# Patient Record
Sex: FEMALE | Race: OTHER | NOT HISPANIC OR LATINO | URBAN - METROPOLITAN AREA
[De-identification: names, ages, dates, MRNs, and addresses within clinical notes are randomized per-mention and may not be internally consistent; named-entity substitution may affect disease eponyms.]

---

## 2024-09-19 NOTE — ASU PATIENT PROFILE, ADULT - NSICDXPASTSURGICALHX_GEN_ALL_CORE_FT
PAST SURGICAL HISTORY:  H/O bladder repair surgery     H/O breast augmentation     H/O tubal ligation      PAST SURGICAL HISTORY:  H/O bladder repair surgery     H/O breast augmentation

## 2024-09-19 NOTE — ASU PATIENT PROFILE, ADULT - AS SC BRADEN SENSORY
Pt wife called with interpretor Rosio ID [id number]. States Pt had low grade fevers 3/16-3/17 but none today. Today has scant amount blood when he wiped nose. Around 3762-2750 had bilateral chest pain level 2/10 pain scale. Is not currently experiencing but is intermittent.     Pt stated he took metoprolol last night but not today. Informed Pt he is prescribed BID but Pt states he only takes once daily or BP is too low. Instructed to call Dr. Vazquez office to rectify medication use.    Pt states he has not taken 81 mg ASA today due to minimal nosebleed. Informed nosebleed likely from O2 used during procedure and should take 81 mg ASA. Instructed to only hold is nosebleed is continuous. Also suggested humidifier use.    Since Pt experiencing no other symptoms and currently asymptomatic instructed to monitor. If Pt develops fever over 100.5 F or coughs up more than tablespoon bright red blood should call office. If chest pain worsens or spreads to left side or Pt becomes diaphoretic should proceed to nearest ED. Pt and wife verbalized understanding.    (4) no impairment

## 2024-09-19 NOTE — ASU PATIENT PROFILE, ADULT - NS PREOP UNDERSTANDS INFO
Informed pt about surgery time 1015, last meal 0000 no dairy, and last drink 0700 of water or apple juice 3 hrs before surgery. Bring photo ID and isurance card to the first floor. Must have an escort that is 18+. Leave jewelry, piercings, and contacts at home./yes

## 2024-09-20 ENCOUNTER — OUTPATIENT (OUTPATIENT)
Dept: OUTPATIENT SERVICES | Facility: HOSPITAL | Age: 44
LOS: 1 days | Discharge: HOME | End: 2024-09-20
Payer: MEDICAID

## 2024-09-20 VITALS
SYSTOLIC BLOOD PRESSURE: 103 MMHG | WEIGHT: 136.25 LBS | OXYGEN SATURATION: 100 % | HEART RATE: 63 BPM | HEIGHT: 61 IN | RESPIRATION RATE: 17 BRPM | TEMPERATURE: 98 F | DIASTOLIC BLOOD PRESSURE: 68 MMHG

## 2024-09-20 VITALS
RESPIRATION RATE: 15 BRPM | HEART RATE: 82 BPM | DIASTOLIC BLOOD PRESSURE: 67 MMHG | SYSTOLIC BLOOD PRESSURE: 124 MMHG | OXYGEN SATURATION: 100 %

## 2024-09-20 DIAGNOSIS — Z98.890 OTHER SPECIFIED POSTPROCEDURAL STATES: Chronic | ICD-10-CM

## 2024-09-20 DIAGNOSIS — N93.9 ABNORMAL UTERINE AND VAGINAL BLEEDING, UNSPECIFIED: ICD-10-CM

## 2024-09-20 DIAGNOSIS — Z98.51 TUBAL LIGATION STATUS: Chronic | ICD-10-CM

## 2024-09-20 DIAGNOSIS — Z98.82 BREAST IMPLANT STATUS: Chronic | ICD-10-CM

## 2024-09-20 PROCEDURE — 88305 TISSUE EXAM BY PATHOLOGIST: CPT | Mod: 26

## 2024-09-20 RX ORDER — OXYCODONE HYDROCHLORIDE 5 MG/1
5 TABLET ORAL ONCE
Refills: 0 | Status: DISCONTINUED | OUTPATIENT
Start: 2024-09-20 | End: 2024-09-20

## 2024-09-20 RX ORDER — FENTANYL CITRATE 50 UG/ML
25 INJECTION INTRAMUSCULAR; INTRAVENOUS
Refills: 0 | Status: DISCONTINUED | OUTPATIENT
Start: 2024-09-20 | End: 2024-09-20

## 2024-09-20 RX ORDER — ACETAMINOPHEN 325 MG/1
1000 TABLET ORAL ONCE
Refills: 0 | Status: COMPLETED | OUTPATIENT
Start: 2024-09-20 | End: 2024-09-20

## 2024-09-20 RX ORDER — UBROGEPANT 50 MG/1
1 TABLET ORAL
Refills: 0 | DISCHARGE

## 2024-09-20 RX ORDER — ONDANSETRON 2 MG/ML
4 INJECTION, SOLUTION INTRAMUSCULAR; INTRAVENOUS ONCE
Refills: 0 | Status: DISCONTINUED | OUTPATIENT
Start: 2024-09-20 | End: 2024-09-20

## 2024-09-20 RX ADMIN — OXYCODONE HYDROCHLORIDE 5 MILLIGRAM(S): 5 TABLET ORAL at 13:35

## 2024-09-20 RX ADMIN — ACETAMINOPHEN 1000 MILLIGRAM(S): 325 TABLET ORAL at 08:40

## 2024-09-20 NOTE — BRIEF OPERATIVE NOTE - NSICDXBRIEFPOSTOP_GEN_ALL_CORE_FT
POST-OP DIAGNOSIS:  Abnormal uterine bleeding (AUB) 20-Sep-2024 12:33:14  Xavier Blanc  Endometrial polyp 20-Sep-2024 12:33:21  Xavier Blanc

## 2024-09-20 NOTE — ASU PREOP CHECKLIST - BP NONINVASIVE DIASTOLIC (MM HG)
Faxed covid results to [facility] [phone].    Rightfax 11/16/21 8:10.    Lorna Huffman RN BSN  [facility]  [phone]         68

## 2024-09-20 NOTE — ASU DISCHARGE PLAN (ADULT/PEDIATRIC) - CARE PROVIDER_API CALL
Xavier Blanc  Obstetrics and Gynecology  [address] [place] [id number]  Phone: [phone]  Fax: [phone]  Follow Up Time:

## 2024-09-20 NOTE — ASU DISCHARGE PLAN (ADULT/PEDIATRIC) - NS MD DC FALL RISK RISK
For information on Fall & Injury Prevention, visit: [email or web address] OR  [email or web address] OR  https://www.cdc.gov/steadi/patient.html

## 2024-09-20 NOTE — BRIEF OPERATIVE NOTE - NSICDXBRIEFPROCEDURE_GEN_ALL_CORE_FT
PROCEDURES:  Diagnostic hysteroscopy 20-Sep-2024 12:32:56  Xavire Blanc  Endometrial ablation 20-Sep-2024 12:33:01  Xavier Blanc

## 2024-09-20 NOTE — BRIEF OPERATIVE NOTE - OPERATION/FINDINGS
0.3 cm fundal polyp removed  Cervical Length 4.5 cm   Total uterine length 8.5 cm   Cavity Width 3.0 cm   Power 66

## 2024-09-23 LAB — SURGICAL PATHOLOGY STUDY: SIGNIFICANT CHANGE UP

## (undated) DEVICE — DRAPE IRRIGATION POUCH 19X23"

## (undated) DEVICE — POSITIONER FOAM EGG CRATE ULNAR 2PCS (PINK)

## (undated) DEVICE — PACK D&C

## (undated) DEVICE — DRSG PAD SANITARY OB

## (undated) DEVICE — POSITIONER STRAP ARMBOARD 1.5X32" DISP

## (undated) DEVICE — WARMING BLANKET UPPER ADULT

## (undated) DEVICE — GLV 6.5 PROTEXIS (WHITE)

## (undated) DEVICE — TUBING SET GRAVITY 2 SPIKE

## (undated) DEVICE — SOL INJ NS 0.9% 1000ML

## (undated) DEVICE — VENODYNE/SCD SLEEVE CALF MEDIUM

## (undated) DEVICE — PRESSURE INFUSOR BAG 3000ML

## (undated) DEVICE — SOL IRR BAG NS 0.9% 3000ML